# Patient Record
Sex: FEMALE | Race: OTHER | HISPANIC OR LATINO
[De-identification: names, ages, dates, MRNs, and addresses within clinical notes are randomized per-mention and may not be internally consistent; named-entity substitution may affect disease eponyms.]

---

## 2017-06-28 PROBLEM — Z00.00 ENCOUNTER FOR PREVENTIVE HEALTH EXAMINATION: Status: ACTIVE | Noted: 2017-06-28

## 2017-07-20 ENCOUNTER — APPOINTMENT (OUTPATIENT)
Dept: GASTROENTEROLOGY | Facility: CLINIC | Age: 32
End: 2017-07-20

## 2018-01-11 ENCOUNTER — APPOINTMENT (OUTPATIENT)
Dept: GASTROENTEROLOGY | Facility: CLINIC | Age: 33
End: 2018-01-11

## 2018-02-15 ENCOUNTER — TRANSCRIPTION ENCOUNTER (OUTPATIENT)
Age: 33
End: 2018-02-15

## 2018-02-15 ENCOUNTER — APPOINTMENT (OUTPATIENT)
Dept: GASTROENTEROLOGY | Facility: CLINIC | Age: 33
End: 2018-02-15
Payer: MEDICAID

## 2018-02-15 VITALS
SYSTOLIC BLOOD PRESSURE: 125 MMHG | DIASTOLIC BLOOD PRESSURE: 85 MMHG | OXYGEN SATURATION: 98 % | HEART RATE: 91 BPM | TEMPERATURE: 98.3 F | RESPIRATION RATE: 16 BRPM

## 2018-02-15 PROCEDURE — 99205 OFFICE O/P NEW HI 60 MIN: CPT

## 2018-03-16 ENCOUNTER — APPOINTMENT (OUTPATIENT)
Dept: GASTROENTEROLOGY | Facility: CLINIC | Age: 33
End: 2018-03-16
Payer: MEDICAID

## 2018-03-16 PROCEDURE — 91112 GI WIRELESS CAPSULE MEASURE: CPT

## 2018-03-23 ENCOUNTER — LABORATORY RESULT (OUTPATIENT)
Age: 33
End: 2018-03-23

## 2018-03-25 LAB
ALBUMIN SERPL ELPH-MCNC: 4.5 G/DL
ALP BLD-CCNC: 52 U/L
ALT SERPL-CCNC: 11 U/L
ANION GAP SERPL CALC-SCNC: 14 MMOL/L
AST SERPL-CCNC: 21 U/L
BILIRUB SERPL-MCNC: <0.2 MG/DL
BUN SERPL-MCNC: 10 MG/DL
CALCIUM SERPL-MCNC: 9.6 MG/DL
CHLORIDE SERPL-SCNC: 102 MMOL/L
CO2 SERPL-SCNC: 25 MMOL/L
CREAT SERPL-MCNC: 0.59 MG/DL
GLUCOSE SERPL-MCNC: 95 MG/DL
POTASSIUM SERPL-SCNC: 4.6 MMOL/L
PROT SERPL-MCNC: 7.8 G/DL
SODIUM SERPL-SCNC: 141 MMOL/L

## 2018-03-27 ENCOUNTER — RESULT REVIEW (OUTPATIENT)
Age: 33
End: 2018-03-27

## 2018-03-27 ENCOUNTER — OUTPATIENT (OUTPATIENT)
Dept: OUTPATIENT SERVICES | Facility: HOSPITAL | Age: 33
LOS: 1 days | Discharge: HOME | End: 2018-03-27
Payer: COMMERCIAL

## 2018-03-27 ENCOUNTER — APPOINTMENT (OUTPATIENT)
Dept: GASTROENTEROLOGY | Facility: HOSPITAL | Age: 33
End: 2018-03-27
Payer: MEDICAID

## 2018-03-27 PROCEDURE — 43235 EGD DIAGNOSTIC BRUSH WASH: CPT

## 2018-03-27 PROCEDURE — 88305 TISSUE EXAM BY PATHOLOGIST: CPT

## 2018-03-27 PROCEDURE — 43239 EGD BIOPSY SINGLE/MULTIPLE: CPT | Mod: 26

## 2018-03-27 PROCEDURE — 43235 EGD DIAGNOSTIC BRUSH WASH: CPT | Mod: 59

## 2018-03-27 PROCEDURE — C1889: CPT

## 2018-03-28 LAB — SURGICAL PATHOLOGY STUDY: SIGNIFICANT CHANGE UP

## 2018-03-29 ENCOUNTER — OUTPATIENT (OUTPATIENT)
Dept: OUTPATIENT SERVICES | Facility: HOSPITAL | Age: 33
LOS: 1 days | Discharge: HOME | End: 2018-03-29
Payer: COMMERCIAL

## 2018-03-29 ENCOUNTER — APPOINTMENT (OUTPATIENT)
Dept: GASTROENTEROLOGY | Facility: HOSPITAL | Age: 33
End: 2018-03-29
Payer: MEDICAID

## 2018-03-29 PROCEDURE — 91037 ESOPH IMPED FUNCTION TEST: CPT

## 2018-03-29 PROCEDURE — 91035 G-ESOPH REFLX TST W/ELECTROD: CPT | Mod: 26

## 2018-03-29 PROCEDURE — 91010 ESOPHAGUS MOTILITY STUDY: CPT

## 2018-05-03 ENCOUNTER — APPOINTMENT (OUTPATIENT)
Dept: GASTROENTEROLOGY | Facility: CLINIC | Age: 33
End: 2018-05-03
Payer: MEDICAID

## 2018-05-03 VITALS
RESPIRATION RATE: 14 BRPM | HEIGHT: 62 IN | OXYGEN SATURATION: 98 % | BODY MASS INDEX: 33.31 KG/M2 | HEART RATE: 77 BPM | WEIGHT: 181 LBS | TEMPERATURE: 98.1 F | SYSTOLIC BLOOD PRESSURE: 110 MMHG | DIASTOLIC BLOOD PRESSURE: 73 MMHG

## 2018-05-03 PROCEDURE — 99205 OFFICE O/P NEW HI 60 MIN: CPT

## 2018-05-18 ENCOUNTER — OTHER (OUTPATIENT)
Age: 33
End: 2018-05-18

## 2018-10-19 ENCOUNTER — OTHER (OUTPATIENT)
Age: 33
End: 2018-10-19

## 2018-10-26 ENCOUNTER — APPOINTMENT (OUTPATIENT)
Dept: UROLOGY | Facility: CLINIC | Age: 33
End: 2018-10-26

## 2018-11-08 ENCOUNTER — APPOINTMENT (OUTPATIENT)
Dept: GASTROENTEROLOGY | Facility: CLINIC | Age: 33
End: 2018-11-08
Payer: MEDICAID

## 2018-11-08 VITALS
SYSTOLIC BLOOD PRESSURE: 110 MMHG | RESPIRATION RATE: 14 BRPM | BODY MASS INDEX: 34.57 KG/M2 | OXYGEN SATURATION: 98 % | DIASTOLIC BLOOD PRESSURE: 89 MMHG | HEART RATE: 87 BPM | WEIGHT: 189 LBS

## 2018-11-08 DIAGNOSIS — Z86.39 PERSONAL HISTORY OF OTHER ENDOCRINE, NUTRITIONAL AND METABOLIC DISEASE: ICD-10-CM

## 2018-11-08 DIAGNOSIS — Z87.19 PERSONAL HISTORY OF OTHER DISEASES OF THE DIGESTIVE SYSTEM: ICD-10-CM

## 2018-11-08 DIAGNOSIS — Z87.09 PERSONAL HISTORY OF OTHER DISEASES OF THE RESPIRATORY SYSTEM: ICD-10-CM

## 2018-11-08 DIAGNOSIS — Z83.3 FAMILY HISTORY OF DIABETES MELLITUS: ICD-10-CM

## 2018-11-08 PROCEDURE — 99214 OFFICE O/P EST MOD 30 MIN: CPT

## 2018-11-08 RX ORDER — BACILLUS COAGULANS/INULIN 1B-250 MG
CAPSULE ORAL
Refills: 0 | Status: ACTIVE | COMMUNITY

## 2019-07-10 ENCOUNTER — APPOINTMENT (OUTPATIENT)
Dept: ORTHOPEDIC SURGERY | Facility: CLINIC | Age: 34
End: 2019-07-10

## 2019-07-25 ENCOUNTER — APPOINTMENT (OUTPATIENT)
Dept: OBGYN | Facility: CLINIC | Age: 34
End: 2019-07-25
Payer: MEDICAID

## 2019-07-25 DIAGNOSIS — Z87.42 PERSONAL HISTORY OF OTHER DISEASES OF THE FEMALE GENITAL TRACT: ICD-10-CM

## 2019-07-25 DIAGNOSIS — Z01.419 ENCOUNTER FOR GYNECOLOGICAL EXAMINATION (GENERAL) (ROUTINE) W/OUT ABNORMAL FINDINGS: ICD-10-CM

## 2019-07-25 DIAGNOSIS — N92.6 IRREGULAR MENSTRUATION, UNSPECIFIED: ICD-10-CM

## 2019-07-25 PROCEDURE — 36415 COLL VENOUS BLD VENIPUNCTURE: CPT

## 2019-07-25 PROCEDURE — 99385 PREV VISIT NEW AGE 18-39: CPT

## 2019-07-25 NOTE — PHYSICAL EXAM
[Awake] : awake [Alert] : alert [Soft] : soft [Oriented x3] : oriented to person, place, and time [Normal] : uterus [No Bleeding] : there was no active vaginal bleeding [Pap Obtained] : a Pap smear was performed [Uterine Adnexae] : were not tender and not enlarged [Acute Distress] : no acute distress [Mass] : no breast mass [Nipple Discharge] : no nipple discharge [Axillary LAD] : no axillary lymphadenopathy [Tender] : non tender [FreeTextEntry7] : unable to detainee due to adipose thickness of abdominal wall.

## 2019-07-25 NOTE — HISTORY OF PRESENT ILLNESS
[1 Year Ago] : 1 year ago [Last Colonoscopy ___] : Last colonoscopy [unfilled] [Last Pap ___] : Last cervical pap smear was [unfilled] [Reproductive Age] : is of reproductive age [Definite:  ___ (Date)] : the last menstrual period was [unfilled] [Pregnancy History] : denies prior pregnancies [Regular Cycle Intervals] : periods have been irregular

## 2019-07-29 ENCOUNTER — TRANSCRIPTION ENCOUNTER (OUTPATIENT)
Age: 34
End: 2019-07-29

## 2019-08-04 ENCOUNTER — FORM ENCOUNTER (OUTPATIENT)
Age: 34
End: 2019-08-04

## 2019-08-05 ENCOUNTER — APPOINTMENT (OUTPATIENT)
Dept: ORTHOPEDIC SURGERY | Facility: CLINIC | Age: 34
End: 2019-08-05
Payer: MEDICAID

## 2019-08-05 ENCOUNTER — OUTPATIENT (OUTPATIENT)
Dept: OUTPATIENT SERVICES | Facility: HOSPITAL | Age: 34
LOS: 1 days | End: 2019-08-05
Payer: MEDICAID

## 2019-08-05 DIAGNOSIS — M25.562 PAIN IN LEFT KNEE: ICD-10-CM

## 2019-08-05 DIAGNOSIS — M16.10 UNILATERAL PRIMARY OSTEOARTHRITIS, UNSPECIFIED HIP: ICD-10-CM

## 2019-08-05 DIAGNOSIS — M25.561 PAIN IN RIGHT KNEE: ICD-10-CM

## 2019-08-05 PROCEDURE — 99203 OFFICE O/P NEW LOW 30 MIN: CPT

## 2019-08-05 PROCEDURE — 73562 X-RAY EXAM OF KNEE 3: CPT | Mod: 26,LT,RT

## 2019-08-05 PROCEDURE — 73562 X-RAY EXAM OF KNEE 3: CPT

## 2019-08-07 LAB
DHEA-S SERPL-MCNC: 217 UG/DL
ESTRADIOL SERPL-MCNC: 486 PG/ML
FSH SERPL-MCNC: 3 IU/L
LH SERPL-ACNC: 11.2 IU/L
PROLACTIN SERPL-MCNC: 13.5 NG/ML
T3 SERPL-MCNC: 122 NG/DL
T4 SERPL-MCNC: 6.2 UG/DL
TESTOST BND SERPL-MCNC: 3.2 PG/ML
TESTOST SERPL-MCNC: 39.5 NG/DL
TSH SERPL-ACNC: 2.12 UIU/ML

## 2019-08-09 VITALS
DIASTOLIC BLOOD PRESSURE: 80 MMHG | BODY MASS INDEX: 34.78 KG/M2 | WEIGHT: 189 LBS | HEIGHT: 62 IN | HEART RATE: 86 BPM | SYSTOLIC BLOOD PRESSURE: 110 MMHG | OXYGEN SATURATION: 99 %

## 2019-08-09 PROBLEM — M25.561 KNEE PAIN, RIGHT: Status: ACTIVE | Noted: 2019-08-05

## 2019-08-09 PROBLEM — M25.562 KNEE PAIN, LEFT: Status: ACTIVE | Noted: 2019-08-05

## 2019-08-09 NOTE — ASSESSMENT
[FreeTextEntry1] : 34yo female with mild OA of bilateral knees\par weight loss\par home knee strengthening exercises \par she will be enrolled in the knee brace study if approved\par continue NSAIDs PRN\par follow up 4-6 weeks\par \par \par All medical record entries made by the PA/Scribe/Fellow are at my, Dr. Tolu Judge's direction and personally dictated by me on 08/05/2019]. I have reviewed the chart and agree that the record accurately reflects my personal performance of the history, physical exam, assessment, and plan. I have also personally directed reviewed, and agreed with the chart.\par

## 2019-08-09 NOTE — HISTORY OF PRESENT ILLNESS
[Stable] : stable [2] : a current pain level of 2/10 [5] : an average pain level of 5/10 [Intermit.] : ~He/She~ states the symptoms seem to be intermittent [Walking] : worsened by walking [Bending] : worsened by bending [Physical Therapy] : relieved by physical therapy [de-identified] : 32yo female with bilateral knee pain, left worse than right. she states that this has been going on for 3 years. she has not had any previous treatment besides PT. her last session of PT was in July and it did help her. she enjoys walking and states that sometimes her knees hurt during walks. she is not physically active and is overweight.  [Ataxia] : no ataxia [Incontinence] : no incontinence

## 2019-08-09 NOTE — PHYSICAL EXAM
[] : Motor: [NL] : normal and symmetric bilaterally [LE] : Sensory: Intact in bilateral lower extremities [2+] : right 2+ [Normal] : Oriented to person, place, and time, insight and judgement were intact and the affect was normal [de-identified] : B/L Knee: skin intact. no effusions. ROM 0-130 with no pain. negative lachman, kavin's, posterior drawer. collateral ligaments stable. negative apley compression.  [de-identified] : xrays bilateral knees: no fractures or dislocations. joint space is narrowed, mild valgus alignment. impression mild OA bilateral knees.

## 2020-01-06 ENCOUNTER — APPOINTMENT (OUTPATIENT)
Dept: GASTROENTEROLOGY | Facility: CLINIC | Age: 35
End: 2020-01-06
Payer: MEDICAID

## 2020-01-06 VITALS
RESPIRATION RATE: 15 BRPM | SYSTOLIC BLOOD PRESSURE: 115 MMHG | OXYGEN SATURATION: 98 % | HEIGHT: 62 IN | HEART RATE: 90 BPM | BODY MASS INDEX: 32.57 KG/M2 | DIASTOLIC BLOOD PRESSURE: 83 MMHG | TEMPERATURE: 98.2 F | WEIGHT: 177 LBS

## 2020-01-06 DIAGNOSIS — K59.04 CHRONIC IDIOPATHIC CONSTIPATION: ICD-10-CM

## 2020-01-06 PROCEDURE — 99204 OFFICE O/P NEW MOD 45 MIN: CPT

## 2020-01-08 NOTE — PHYSICAL EXAM
[General Appearance - Alert] : alert [General Appearance - In No Acute Distress] : in no acute distress [PERRL With Normal Accommodation] : pupils were equal in size, round, and reactive to light [Sclera] : the sclera and conjunctiva were normal [Extraocular Movements] : extraocular movements were intact [Neck Appearance] : the appearance of the neck was normal [Respiration, Rhythm And Depth] : normal respiratory rhythm and effort [Exaggerated Use Of Accessory Muscles For Inspiration] : no accessory muscle use [Heart Rate And Rhythm] : heart rate was normal and rhythm regular [Heart Sounds] : normal S1 and S2 [Bowel Sounds] : normal bowel sounds [Abdomen Soft] : soft [Abdomen Tenderness] : non-tender [Abdomen Mass (___ Cm)] : no abdominal mass palpated [Abdomen Hernia] : no hernia was discovered [Normal Sphincter Tone] : normal sphincter tone [No Rectal Mass] : no rectal mass [Abnormal Walk] : normal gait [Musculoskeletal - Swelling] : no joint swelling seen [Skin Turgor] : normal skin turgor [] : no rash [Cranial Nerves] : cranial nerves 2-12 were intact [No Focal Deficits] : no focal deficits [Oriented To Time, Place, And Person] : oriented to person, place, and time [Impaired Insight] : insight and judgment were intact [External Hemorrhoid] : no external hemorrhoids [FreeTextEntry1] : Chaperone by Sherlyn

## 2020-01-08 NOTE — HISTORY OF PRESENT ILLNESS
[de-identified] : 34F w/ hx of asthma, anxiety/depression, IBS-C p/f follow up for abdominal pain, bloating, and constipation.  Patient reports ongoing symptoms since prior visit in 2018 with Dr. Bangura but was lost to followup after she was no longer available.  Since then, patient has discontinued linzess due to poor efficacy and noted worsening abd pain, nausea, bloating, globus sensation, constipation, and blood on toilet paper.  She was evaluated by an ENT last summer and was told she has acid before starting prilosec 40 mg once a day. She reports following a psychiatrist but not taking any antidepressant due to not being offered her choice.  She also reports use of naproxen for her joint pain.  \par \par Other than the above symptoms, the patient offers no additional symptoms at today's visit. She specifically denies any odynophagia, hoarseness, changes in voice, vomiting, localized/focal unintentional weight loss, fevers, chills, skin rash, loose stool, melena.  She does report some blood on toilet paper with wiping, but not in stool.  \par \par She has no known personal or family history of any gastrointestinal disease. She also has no reported history of any acute or chronic viral illnesses in the past and does not have any recent travel history outside of the [place]. She does not use narcotics, TCAs, prokinetics or any other medications with known effects on gastrointestinal motility. She has no prior history or clinical symptoms suggestive of underlying autoimmune or connective tissue disease, and reports no prior gastric or thoracic surgeries.\par \par Prior evaluation\par EGD 2018: Mild duodenitis, otherwise normal study. Biopsies negative for H. pylori\par [employer] pH capsule testing: normal study\par SmartPill/WMC testing: Normal gastric and small bowel transit time, prolonged colonic transit time\par ARM: Weak augmentation of sphincter tone, weak generation of IR pressures with bearing down, mild visceral hypersensitivity\par

## 2020-01-08 NOTE — ASSESSMENT
[FreeTextEntry1] : 34F w/ hx of asthma, anxiety/depression, IBS-C p/f follow up for abdominal pain, bloating, and constipation.\par \par Abdominal pain: Likely from bloating and gas distension in the setting of IBS-C.  No longer on Linzess and reports inadherence to miralax.  Discussed at length of low FODMAP diet and initiation of motegrity following discussion of R/B/A.  \par -Motegrity 2 mg once a day\par -Miralax twice a day prn\par -TCA if unable to obtain motegrity\par -Refer to biofeedback\par -low FODMAP diet\par \par Heartburn: \par Prior bravo with normal study but now with increase in naproxen use.  \par -Avoid NSAIDs, discussed tylenol 3g/d for pain control\par -Continue prilosec 40 mg qd\par \par Anxiety/Depression:\par Patient seeing a psychiatrist and reports compliance.  \par -Continue psych followup\par \par RTC in 4w to reassess symptom\par \par Tolu Liriano MD\par PGY4, Gastroenterology\par

## 2020-01-15 ENCOUNTER — MOBILE ON CALL (OUTPATIENT)
Age: 35
End: 2020-01-15

## 2020-02-04 ENCOUNTER — APPOINTMENT (OUTPATIENT)
Dept: GASTROENTEROLOGY | Facility: CLINIC | Age: 35
End: 2020-02-04

## 2020-03-09 ENCOUNTER — APPOINTMENT (OUTPATIENT)
Dept: GASTROENTEROLOGY | Facility: CLINIC | Age: 35
End: 2020-03-09
Payer: MEDICAID

## 2020-03-09 VITALS
OXYGEN SATURATION: 98 % | WEIGHT: 176 LBS | HEART RATE: 101 BPM | TEMPERATURE: 98.7 F | DIASTOLIC BLOOD PRESSURE: 80 MMHG | BODY MASS INDEX: 32.39 KG/M2 | HEIGHT: 62 IN | RESPIRATION RATE: 15 BRPM | SYSTOLIC BLOOD PRESSURE: 110 MMHG

## 2020-03-09 PROCEDURE — 99214 OFFICE O/P EST MOD 30 MIN: CPT

## 2020-03-09 RX ORDER — LINACLOTIDE 72 UG/1
72 CAPSULE, GELATIN COATED ORAL TWICE DAILY
Qty: 60 | Refills: 0 | Status: DISCONTINUED | COMMUNITY
Start: 2018-11-08 | End: 2020-03-09

## 2020-03-09 RX ORDER — CIMETIDINE 800 MG
TABLET ORAL
Refills: 0 | Status: DISCONTINUED | COMMUNITY
End: 2020-03-09

## 2020-03-09 RX ORDER — DOCUSATE SODIUM 100 MG/1
100 CAPSULE ORAL TWICE DAILY
Qty: 60 | Refills: 0 | Status: DISCONTINUED | COMMUNITY
Start: 2020-01-15 | End: 2020-03-09

## 2020-03-09 RX ORDER — LACTULOSE 10 G/15ML
SOLUTION ORAL
Refills: 0 | Status: DISCONTINUED | COMMUNITY
End: 2020-03-09

## 2020-03-09 RX ORDER — FLUOXETINE HYDROCHLORIDE 10 MG/1
10 CAPSULE ORAL
Refills: 0 | Status: DISCONTINUED | COMMUNITY
End: 2020-03-09

## 2020-03-09 RX ORDER — OMEPRAZOLE MAGNESIUM 20 MG/1
20 CAPSULE, DELAYED RELEASE ORAL
Refills: 0 | Status: DISCONTINUED | COMMUNITY
End: 2020-03-09

## 2020-03-09 RX ORDER — PSYLLIUM SEED
50 POWDER (GRAM) ORAL
Refills: 0 | Status: DISCONTINUED | COMMUNITY
End: 2020-03-09

## 2020-03-09 RX ORDER — NAPROXEN 500 MG/1
500 TABLET ORAL
Refills: 0 | Status: DISCONTINUED | COMMUNITY
End: 2020-03-09

## 2020-03-09 NOTE — ASSESSMENT
[FreeTextEntry1] : 34F w/ hx of asthma, anxiety/depression, IBS-C presents for post-ED visit follow-up with c/o abdominal pain, nausea, and heartburn\par \par # IBS-C\par - Pt states that she previously had improvement on Linzess 72 mcg x 2 days, however, discontinued as it no longer had efficacy. At that time, she was not dose optimized, thus, recommend increasing to Linzess 290 mcg daily. Discussed potential side effects including diarrhea and appropriate dosing\par - If no improvement on Linzess, will consider Motegrity\par - C/w Miralax PRN\par - Low FODMAP diet\par - Referral to biofeedback\par \par Heartburn: \par Prior bravo with normal study but now with increase in naproxen use.  \par -Avoid NSAIDs, discussed tylenol 3g/d for pain control\par -Continue protonix x 8 weeks, re-evaluate and consider tapering at that time pending symptomatic improvement\par \par Anxiety/Depression:\par Patient seeing a psychiatrist and reports compliance.  \par -Continue psych followup\par -If consideration of neuromodulators in the future, would discuss with patients psychiatrist prior\par \par F/u via phone or patient portal in 2 weeks to assess for symptom improvement\par

## 2020-03-09 NOTE — HISTORY OF PRESENT ILLNESS
[de-identified] : 34F w/ hx of asthma, anxiety/depression, IBS-C presents for post-ED visit follow-up. \par \par Pt states that 2 weeks ago she presented to [facility] ED for worsening abdominal pain, nausea, gagging, and acid reflux. She had blood work, a CT A/P, and urine studies which were all normal. She was diagnosed with gastritis and started on Protonix with resolution of her abdominal pain, however, continues to have regurgitation and nausea. \par \par Since her last visit she tried Colace without improvement. Currently on Senna and Miralax with mild improvement. Has not tried Motegrity yet as it required insurance auth. Has a BM daily with incomplete evacuation and straining, last BM yesterday AM.  Also reports bloating, but denies melena, hematochezia, odynophagia, dysphagia. \par \par PRIOR HX:\par Patient reports ongoing symptoms since prior visit in 2018 with Dr. Bangura but was lost to followup after she was no longer available.  Since then, patient has discontinued linzess due to poor efficacy and noted worsening abd pain, nausea, bloating, globus sensation, constipation, and blood on toilet paper.  She was evaluated by an ENT last summer and was told she has acid before starting prilosec 40 mg once a day. She reports following a psychiatrist but not taking any antidepressant due to not being offered her choice.  She also reports use of naproxen for her joint pain.  \par \par Other than the above symptoms, the patient offers no additional symptoms at today's visit. She specifically denies any odynophagia, hoarseness, changes in voice, vomiting, localized/focal unintentional weight loss, fevers, chills, skin rash, loose stool, melena.  She does report some blood on toilet paper with wiping, but not in stool.  \par \par She has no known personal or family history of any gastrointestinal disease. She also has no reported history of any acute or chronic viral illnesses in the past and does not have any recent travel history outside of the [place]. She does not use narcotics, TCAs, prokinetics or any other medications with known effects on gastrointestinal motility. She has no prior history or clinical symptoms suggestive of underlying autoimmune or connective tissue disease, and reports no prior gastric or thoracic surgeries.\par \par Prior evaluation\par EGD 2018: Mild duodenitis, otherwise normal study. Biopsies negative for H. pylori\par [employer] pH capsule testing: normal study\par SmartPill/WMC testing: Normal gastric and small bowel transit time, prolonged colonic transit time\par ARM: Weak augmentation of sphincter tone, weak generation of IR pressures with bearing down, mild visceral hypersensitivity\par

## 2020-03-09 NOTE — PHYSICAL EXAM
[General Appearance - Alert] : alert [General Appearance - In No Acute Distress] : in no acute distress [Sclera] : the sclera and conjunctiva were normal [PERRL With Normal Accommodation] : pupils were equal in size, round, and reactive to light [Extraocular Movements] : extraocular movements were intact [Neck Appearance] : the appearance of the neck was normal [Exaggerated Use Of Accessory Muscles For Inspiration] : no accessory muscle use [Bowel Sounds] : normal bowel sounds [Abdomen Soft] : soft [Abdomen Tenderness] : non-tender [Abdomen Mass (___ Cm)] : no abdominal mass palpated [Abdomen Hernia] : no hernia was discovered [Abnormal Walk] : normal gait [Skin Turgor] : normal skin turgor [] : no rash [No Focal Deficits] : no focal deficits [Oriented To Time, Place, And Person] : oriented to person, place, and time [General Appearance - Well Nourished] : well nourished [General Appearance - Well Developed] : well developed [Outer Ear] : the ears and nose were normal in appearance [Hearing Threshold Finger Rub Not [place]] : hearing was normal [Examination Of The Oral Cavity] : the lips and gums were normal [Oropharynx] : the oropharynx was normal [Edema] : there was no peripheral edema [Cervical Lymph Nodes Enlarged Posterior Bilaterally] : posterior cervical [Cervical Lymph Nodes Enlarged Anterior Bilaterally] : anterior cervical [Supraclavicular Lymph Nodes Enlarged Bilaterally] : supraclavicular [No CVA Tenderness] : no ~M costovertebral angle tenderness [No Spinal Tenderness] : no spinal tenderness [Affect] : the affect was normal [Mood] : the mood was normal [External Hemorrhoid] : no external hemorrhoids

## 2020-04-08 RX ORDER — PRUCALOPRIDE 2 MG/1
2 TABLET, FILM COATED ORAL
Qty: 30 | Refills: 2 | Status: DISCONTINUED | COMMUNITY
Start: 2020-01-06 | End: 2020-04-08

## 2020-04-13 ENCOUNTER — FORM ENCOUNTER (OUTPATIENT)
Age: 35
End: 2020-04-13

## 2020-05-05 ENCOUNTER — APPOINTMENT (OUTPATIENT)
Dept: GASTROENTEROLOGY | Facility: CLINIC | Age: 35
End: 2020-05-05

## 2020-07-22 ENCOUNTER — LABORATORY RESULT (OUTPATIENT)
Age: 35
End: 2020-07-22

## 2020-07-22 ENCOUNTER — APPOINTMENT (OUTPATIENT)
Age: 35
End: 2020-07-22
Payer: MEDICAID

## 2020-07-22 DIAGNOSIS — R11.2 NAUSEA WITH VOMITING, UNSPECIFIED: ICD-10-CM

## 2020-07-22 PROCEDURE — 36415 COLL VENOUS BLD VENIPUNCTURE: CPT

## 2020-07-22 PROCEDURE — 99214 OFFICE O/P EST MOD 30 MIN: CPT | Mod: 25

## 2020-07-22 RX ORDER — MELATONIN 3 MG
25 MCG TABLET ORAL
Refills: 0 | Status: DISCONTINUED | COMMUNITY
End: 2020-07-22

## 2020-07-22 RX ORDER — MIRTAZAPINE 15 MG/1
15 TABLET, FILM COATED ORAL
Refills: 0 | Status: DISCONTINUED | COMMUNITY
Start: 2020-03-09 | End: 2020-07-22

## 2020-07-22 RX ORDER — ONDANSETRON 4 MG/1
4 TABLET, ORALLY DISINTEGRATING ORAL EVERY 8 HOURS
Qty: 30 | Refills: 0 | Status: ACTIVE | COMMUNITY
Start: 2020-07-22 | End: 1900-01-01

## 2020-07-22 RX ORDER — SENNOSIDES 8.6 MG TABLETS 8.6 MG/1
8.6 TABLET ORAL DAILY
Qty: 120 | Refills: 0 | Status: DISCONTINUED | COMMUNITY
Start: 2020-01-15 | End: 2020-07-22

## 2020-07-23 LAB
25(OH)D3 SERPL-MCNC: 32.1 NG/ML
CRP SERPL-MCNC: 0.35 MG/DL
ENA SCL70 IGG SER IA-ACNC: <0.2 AL
FERRITIN SERPL-MCNC: 31 NG/ML
FOLATE SERPL-MCNC: 17.3 NG/ML
GLIADIN IGA SER QL: 7.9 UNITS
GLIADIN IGG SER QL: <5 UNITS
GLIADIN PEPTIDE IGA SER-ACNC: NEGATIVE
GLIADIN PEPTIDE IGG SER-ACNC: NEGATIVE
IGA SER QL IEP: 140 MG/DL
IRON SATN MFR SERPL: 19 %
IRON SERPL-MCNC: 66 UG/DL
TIBC SERPL-MCNC: 345 UG/DL
TTG IGA SER IA-ACNC: <1.2 U/ML
TTG IGA SER-ACNC: NEGATIVE
TTG IGG SER IA-ACNC: 1.6 U/ML
TTG IGG SER IA-ACNC: NEGATIVE
UIBC SERPL-MCNC: 279 UG/DL
VIT B12 SERPL-MCNC: 332 PG/ML

## 2020-07-23 NOTE — PHYSICAL EXAM
[General Appearance - Well Nourished] : well nourished [General Appearance - In No Acute Distress] : in no acute distress [General Appearance - Alert] : alert [Sclera] : the sclera and conjunctiva were normal [General Appearance - Well Developed] : well developed [PERRL With Normal Accommodation] : pupils were equal in size, round, and reactive to light [Extraocular Movements] : extraocular movements were intact [Outer Ear] : the ears and nose were normal in appearance [Hearing Threshold Finger Rub Not [place]] : hearing was normal [Oropharynx] : the oropharynx was normal [Examination Of The Oral Cavity] : the lips and gums were normal [Neck Appearance] : the appearance of the neck was normal [Exaggerated Use Of Accessory Muscles For Inspiration] : no accessory muscle use [Edema] : there was no peripheral edema [Bowel Sounds] : normal bowel sounds [Abdomen Soft] : soft [Abdomen Tenderness] : non-tender [Abdomen Hernia] : no hernia was discovered [Abdomen Mass (___ Cm)] : no abdominal mass palpated [Abnormal Walk] : normal gait [No Spinal Tenderness] : no spinal tenderness [] : no rash [No Focal Deficits] : no focal deficits [Skin Turgor] : normal skin turgor [Affect] : the affect was normal [Mood] : the mood was normal [Oriented To Time, Place, And Person] : oriented to person, place, and time [FreeTextEntry1] : slight tenderness felt throughout

## 2020-07-23 NOTE — HISTORY OF PRESENT ILLNESS
[de-identified] : 34F w/ hx of asthma, anxiety/depression, and constipation presents for follow up for continued symptoms along with new symptom of nausea. \par \par Patient reports that since last visit with Dr. Rosado (3/2020), she has began to have nausea with gagging and will vomit some clear liquids. Nausea is happening every day. She has vomited actual food one time but mostly is just liquids. She is feeling worse than last visit.  \par She has been trying to drink a lot of water be more active. Can not pinpoint food trigger. No blood in vomit.\par \par Taking Linzess 290 mcg - depending on day, anywhere from 1-3 bowel movements (sometimes diarrhea). Eating oatmeal or brand flakes. Eating 1 meal per day and not hungry to eat much more than that. She notices her anxiety and stress has gotten worse. She may have lost a couple of pounds. Sometimes blood when wipes with straining. Has a history of internal hemorrhoids found on colonoscopy in 2016. \par \par Previous history:\par Pt states that 2 weeks ago she presented to [facility] ED for worsening abdominal pain, nausea, gagging, and acid reflux. She had blood work, a CT A/P, and urine studies which were all normal. She was diagnosed with gastritis and started on Protonix with resolution of her abdominal pain, however, continues to have regurgitation and nausea. \par \par Since her last visit she tried Colace without improvement. Currently on Senna and Miralax with mild improvement. Has not tried Motegrity yet as it required insurance auth. Has a BM daily with incomplete evacuation and straining, last BM yesterday AM.  Also reports bloating, but denies melena, hematochezia, odynophagia, dysphagia. \par \par PRIOR HX:\par Patient reports ongoing symptoms since prior visit in 2018 with Dr. Bangura but was lost to followup after she was no longer available.  Since then, patient has discontinued linzess due to poor efficacy and noted worsening abd pain, nausea, bloating, globus sensation, constipation, and blood on toilet paper.  She was evaluated by an ENT last summer and was told she has acid before starting prilosec 40 mg once a day. She reports following a psychiatrist but not taking any antidepressant due to not being offered her choice.  She also reports use of naproxen for her joint pain.  \par \par Other than the above symptoms, the patient offers no additional symptoms at today's visit. She specifically denies any odynophagia, hoarseness, changes in voice, vomiting, localized/focal unintentional weight loss, fevers, chills, skin rash, loose stool, melena.  She does report some blood on toilet paper with wiping, but not in stool.  \par \par She has no known personal or family history of any gastrointestinal disease. She also has no reported history of any acute or chronic viral illnesses in the past and does not have any recent travel history outside of the [place]. She does not use narcotics, TCAs, prokinetics or any other medications with known effects on gastrointestinal motility. She has no prior history or clinical symptoms suggestive of underlying autoimmune or connective tissue disease, and reports no prior gastric or thoracic surgeries.\par \par Prior evaluation\par EGD 2018: Mild duodenitis, otherwise normal study. Biopsies negative for H. pylori\par [employer] pH capsule testing: normal study\par SmartPill/WMC testing: Normal gastric and small bowel transit time, prolonged colonic transit time\par ARM: Weak augmentation of sphincter tone, weak generation of IR pressures with bearing down, mild visceral hypersensitivity\par

## 2020-07-23 NOTE — ASSESSMENT
[FreeTextEntry1] : 34F w/ hx of asthma, anxiety/depression, and constipation presents for follow-up with c/o nausea, early satiety and intermittent diarrhea/constipation with straining  \par \par Nausea \par - unclear etiology- could be but not limited to underlying autoimmune disease, food allergies, gallbladder disease \par - collect food allergy panel, ASIA and celiac disease markers, scleroderma antibodies \par - may need imaging whether x ray or repeat CT scan, encouraged patient to send hard copy of previous CT scan in march to review\par - retrieve all recent labs including CBC/CMP\par - prescribed Zofran for symptomatic relief \par \par Constipation \par - currently taking Linzess 290mcg and having 3 BMs which sometimes consist of diarrhea therefore recommend adding fiber in the morning (recommended either Citrucel, psyllium husk or Benefiber, 1 tablespoon daily) \par - may need to decrease dose to 145mcg if fiber does not help bulk stool or consider Motegrity with finding of delayed colonic transit time on SmartPill \par - will discuss biofeedback therapy at next visit due to ARM results \par - may consider stool samples in diarrhea persists but likely due to high dose Linzess\par \par Anxiety/Depression:\par Patient seeing a psychiatrist and reports compliance.  \par -Continue psych followup\par -If consideration of neuromodulators in the future, would discuss with patients psychiatrist prior\par \par f/u with results and next steps

## 2020-07-24 ENCOUNTER — TRANSCRIPTION ENCOUNTER (OUTPATIENT)
Age: 35
End: 2020-07-24

## 2020-07-24 LAB — ANA SER IF-ACNC: NEGATIVE

## 2020-07-27 ENCOUNTER — TRANSCRIPTION ENCOUNTER (OUTPATIENT)
Age: 35
End: 2020-07-27

## 2020-07-27 LAB
BARLEY IGE QN: <0.1 KUA/L
CHERRY IGE QN: <0.1 KUA/L
COW MILK IGE QN: <0.1 KUA/L
CRAB IGE QN: <0.1 KUA/L
DEPRECATED BARLEY IGE RAST QL: 0
DEPRECATED CHERRY IGE RAST QL: 0
DEPRECATED COW MILK IGE RAST QL: 0
DEPRECATED CRAB IGE RAST QL: 0
DEPRECATED EGG WHITE IGE RAST QL: 0
DEPRECATED OAT IGE RAST QL: 0
DEPRECATED PEANUT IGE RAST QL: 0
DEPRECATED RYE IGE RAST QL: 0
DEPRECATED SOYBEAN IGE RAST QL: 0
DEPRECATED WHEAT IGE RAST QL: 0
EGG WHITE IGE QN: <0.1 KUA/L
OAT IGE QN: <0.1 KUA/L
PEANUT IGE QN: <0.1 KUA/L
RYE IGE QN: <0.1 KUA/L
SOYBEAN IGE QN: <0.1 KUA/L
TOTAL IGE SMQN RAST: 50 KU/L
WHEAT IGE QN: <0.1 KUA/L

## 2020-07-28 ENCOUNTER — TRANSCRIPTION ENCOUNTER (OUTPATIENT)
Age: 35
End: 2020-07-28

## 2020-07-29 ENCOUNTER — TRANSCRIPTION ENCOUNTER (OUTPATIENT)
Age: 35
End: 2020-07-29

## 2020-07-30 ENCOUNTER — TRANSCRIPTION ENCOUNTER (OUTPATIENT)
Age: 35
End: 2020-07-30

## 2020-07-31 ENCOUNTER — TRANSCRIPTION ENCOUNTER (OUTPATIENT)
Age: 35
End: 2020-07-31

## 2020-08-03 ENCOUNTER — TRANSCRIPTION ENCOUNTER (OUTPATIENT)
Age: 35
End: 2020-08-03

## 2020-08-13 ENCOUNTER — TRANSCRIPTION ENCOUNTER (OUTPATIENT)
Age: 35
End: 2020-08-13

## 2020-08-17 LAB
C DIFF TOX GENS STL QL NAA+PROBE: NORMAL
CDIFF BY PCR: NOT DETECTED
DEPRECATED O AND P PREP STL: NORMAL
GI PCR PANEL, STOOL: ABNORMAL

## 2020-08-20 ENCOUNTER — TRANSCRIPTION ENCOUNTER (OUTPATIENT)
Age: 35
End: 2020-08-20

## 2020-08-20 LAB — PANCREATIC ELASTASE, FECAL: >500

## 2020-08-26 ENCOUNTER — TRANSCRIPTION ENCOUNTER (OUTPATIENT)
Age: 35
End: 2020-08-26

## 2020-08-27 ENCOUNTER — TRANSCRIPTION ENCOUNTER (OUTPATIENT)
Age: 35
End: 2020-08-27

## 2020-08-27 LAB — CALPROTECTIN FECAL: 86 UG/G

## 2020-08-27 RX ORDER — LINACLOTIDE 145 UG/1
145 CAPSULE, GELATIN COATED ORAL
Qty: 30 | Refills: 2 | Status: ACTIVE | COMMUNITY
Start: 2020-08-27 | End: 1900-01-01

## 2020-08-28 ENCOUNTER — TRANSCRIPTION ENCOUNTER (OUTPATIENT)
Age: 35
End: 2020-08-28

## 2020-09-22 ENCOUNTER — APPOINTMENT (OUTPATIENT)
Age: 35
End: 2020-09-22
Payer: MEDICAID

## 2020-09-22 VITALS
HEART RATE: 115 BPM | WEIGHT: 178.38 LBS | TEMPERATURE: 98.1 F | DIASTOLIC BLOOD PRESSURE: 82 MMHG | OXYGEN SATURATION: 98 % | BODY MASS INDEX: 32.82 KG/M2 | HEIGHT: 62 IN | SYSTOLIC BLOOD PRESSURE: 118 MMHG

## 2020-09-22 DIAGNOSIS — K59.09 OTHER CONSTIPATION: ICD-10-CM

## 2020-09-22 DIAGNOSIS — R09.89 OTHER SPECIFIED SYMPTOMS AND SIGNS INVOLVING THE CIRCULATORY AND RESPIRATORY SYSTEMS: ICD-10-CM

## 2020-09-22 DIAGNOSIS — K21.9 GASTRO-ESOPHAGEAL REFLUX DISEASE W/OUT ESOPHAGITIS: ICD-10-CM

## 2020-09-22 DIAGNOSIS — K58.1 IRRITABLE BOWEL SYNDROME WITH CONSTIPATION: ICD-10-CM

## 2020-09-22 DIAGNOSIS — K52.9 NONINFECTIVE GASTROENTERITIS AND COLITIS, UNSPECIFIED: ICD-10-CM

## 2020-09-22 PROCEDURE — 99214 OFFICE O/P EST MOD 30 MIN: CPT

## 2020-09-22 RX ORDER — FAMOTIDINE 40 MG/1
40 TABLET, FILM COATED ORAL
Qty: 30 | Refills: 2 | Status: ACTIVE | COMMUNITY
Start: 2020-09-22 | End: 1900-01-01

## 2020-09-24 PROBLEM — K59.09 CONSTIPATION, CHRONIC: Status: ACTIVE | Noted: 2018-05-18

## 2020-09-24 PROBLEM — K58.1 IRRITABLE BOWEL SYNDROME WITH CONSTIPATION: Status: ACTIVE | Noted: 2020-03-09

## 2020-09-24 PROBLEM — R09.89 GLOBUS SENSATION: Status: ACTIVE | Noted: 2020-09-24

## 2020-09-24 PROBLEM — K52.9 CHRONIC DIARRHEA: Status: ACTIVE | Noted: 2020-08-03

## 2020-09-24 NOTE — ASSESSMENT
[FreeTextEntry1] : 34F w/ a PMH significant for asthma, anxiety/depression, IBS-C who presents to clinic for follow up of reflux symptoms.\par \par #Vomiting\par - strong component of anxiety contributing to this patient's GI symptoms\par - in order to decrease acidity will add on famotidine qhs \par - will refer to Psychiatry to help evaluate anxiety\par \par d/w Dr. Rosado\par \par Mich Toure MD\par PGY-4, Gastroenterology Fellow

## 2020-09-24 NOTE — HISTORY OF PRESENT ILLNESS
[de-identified] : This is a 34F w/ a PMH significant for asthma, anxiety/depression, IBS-C who presents to clinic for follow up of reflux symptoms. She states that she feels like she has heartburn, then she vomits and it is always clear, white liquid. This occurs nearly daily. Prescribed prilosec last month, but this has provided no relief. She has had significant diagnostic w/u in the past, which has generally been unremarkable: EGD showed mild duodenitis, otherwise normal study and biopsies were negative for H. pylori, [employer] pH capsule testing was normal, SmartPill testing showed normal gastric and small bowel transit time, prolonged colonic transit time. She states that she gets very anxious when she eats or drinks anything, and this is related to her concern that she will vomit. The anxiety builds up until she vomits, at which point she till has some anxiety that there is something wrong w/ her GI tract.

## 2020-10-04 ENCOUNTER — FORM ENCOUNTER (OUTPATIENT)
Age: 35
End: 2020-10-04

## 2020-10-08 ENCOUNTER — FORM ENCOUNTER (OUTPATIENT)
Age: 35
End: 2020-10-08

## 2020-10-12 ENCOUNTER — FORM ENCOUNTER (OUTPATIENT)
Age: 35
End: 2020-10-12

## 2020-10-13 ENCOUNTER — APPOINTMENT (OUTPATIENT)
Dept: PSYCHIATRY | Facility: CLINIC | Age: 35
End: 2020-10-13

## 2020-10-23 ENCOUNTER — TRANSCRIPTION ENCOUNTER (OUTPATIENT)
Age: 35
End: 2020-10-23

## 2020-10-26 ENCOUNTER — TRANSCRIPTION ENCOUNTER (OUTPATIENT)
Age: 35
End: 2020-10-26

## 2020-10-27 ENCOUNTER — TRANSCRIPTION ENCOUNTER (OUTPATIENT)
Age: 35
End: 2020-10-27

## 2020-11-01 ENCOUNTER — FORM ENCOUNTER (OUTPATIENT)
Age: 35
End: 2020-11-01

## 2020-11-11 ENCOUNTER — FORM ENCOUNTER (OUTPATIENT)
Age: 35
End: 2020-11-11

## 2020-11-12 ENCOUNTER — APPOINTMENT (OUTPATIENT)
Dept: PSYCHIATRY | Facility: CLINIC | Age: 35
End: 2020-11-12

## 2020-11-24 ENCOUNTER — FORM ENCOUNTER (OUTPATIENT)
Age: 35
End: 2020-11-24

## 2020-11-30 ENCOUNTER — FORM ENCOUNTER (OUTPATIENT)
Age: 35
End: 2020-11-30

## 2020-12-01 ENCOUNTER — FORM ENCOUNTER (OUTPATIENT)
Age: 35
End: 2020-12-01

## 2020-12-02 ENCOUNTER — APPOINTMENT (OUTPATIENT)
Dept: PSYCHIATRY | Facility: CLINIC | Age: 35
End: 2020-12-02

## 2020-12-06 ENCOUNTER — FORM ENCOUNTER (OUTPATIENT)
Age: 35
End: 2020-12-06

## 2020-12-07 ENCOUNTER — FORM ENCOUNTER (OUTPATIENT)
Age: 35
End: 2020-12-07

## 2020-12-08 ENCOUNTER — APPOINTMENT (OUTPATIENT)
Dept: PSYCHIATRY | Facility: CLINIC | Age: 35
End: 2020-12-08

## 2020-12-10 ENCOUNTER — APPOINTMENT (OUTPATIENT)
Dept: PSYCHIATRY | Facility: CLINIC | Age: 35
End: 2020-12-10

## 2020-12-14 ENCOUNTER — FORM ENCOUNTER (OUTPATIENT)
Age: 35
End: 2020-12-14

## 2020-12-15 ENCOUNTER — FORM ENCOUNTER (OUTPATIENT)
Age: 35
End: 2020-12-15

## 2020-12-21 ENCOUNTER — FORM ENCOUNTER (OUTPATIENT)
Age: 35
End: 2020-12-21

## 2020-12-21 PROBLEM — Z01.419 ENCOUNTER FOR ANNUAL ROUTINE GYNECOLOGICAL EXAMINATION: Status: RESOLVED | Noted: 2019-07-25 | Resolved: 2020-12-21

## 2020-12-22 ENCOUNTER — APPOINTMENT (OUTPATIENT)
Dept: PSYCHIATRY | Facility: CLINIC | Age: 35
End: 2020-12-22

## 2020-12-29 ENCOUNTER — APPOINTMENT (OUTPATIENT)
Dept: PSYCHIATRY | Facility: CLINIC | Age: 35
End: 2020-12-29

## 2021-01-04 ENCOUNTER — FORM ENCOUNTER (OUTPATIENT)
Age: 36
End: 2021-01-04

## 2021-01-05 ENCOUNTER — APPOINTMENT (OUTPATIENT)
Dept: PSYCHIATRY | Facility: CLINIC | Age: 36
End: 2021-01-05

## 2021-01-06 ENCOUNTER — NON-APPOINTMENT (OUTPATIENT)
Age: 36
End: 2021-01-06

## 2021-01-06 DIAGNOSIS — F34.1 DYSTHYMIC DISORDER: ICD-10-CM

## 2021-01-06 DIAGNOSIS — F39 UNSPECIFIED MOOD [AFFECTIVE] DISORDER: ICD-10-CM

## 2021-01-06 DIAGNOSIS — F32.9 MAJOR DEPRESSIVE DISORDER, SINGLE EPISODE, UNSPECIFIED: ICD-10-CM

## 2021-01-06 DIAGNOSIS — F60.3 BORDERLINE PERSONALITY DISORDER: ICD-10-CM

## 2021-01-06 DIAGNOSIS — Z92.89 PERSONAL HISTORY OF OTHER MEDICAL TREATMENT: ICD-10-CM

## 2021-01-11 ENCOUNTER — FORM ENCOUNTER (OUTPATIENT)
Age: 36
End: 2021-01-11

## 2021-01-12 ENCOUNTER — APPOINTMENT (OUTPATIENT)
Dept: PSYCHIATRY | Facility: CLINIC | Age: 36
End: 2021-01-12

## 2021-01-19 ENCOUNTER — APPOINTMENT (OUTPATIENT)
Dept: PSYCHIATRY | Facility: CLINIC | Age: 36
End: 2021-01-19

## 2021-01-26 ENCOUNTER — APPOINTMENT (OUTPATIENT)
Dept: PSYCHIATRY | Facility: CLINIC | Age: 36
End: 2021-01-26
Payer: MEDICAID

## 2021-01-26 PROCEDURE — 90834 PSYTX W PT 45 MINUTES: CPT | Mod: 95

## 2021-02-02 ENCOUNTER — APPOINTMENT (OUTPATIENT)
Dept: PSYCHIATRY | Facility: CLINIC | Age: 36
End: 2021-02-02
Payer: MEDICAID

## 2021-02-02 PROCEDURE — 90834 PSYTX W PT 45 MINUTES: CPT | Mod: 95

## 2021-02-03 ENCOUNTER — APPOINTMENT (OUTPATIENT)
Dept: PSYCHIATRY | Facility: CLINIC | Age: 36
End: 2021-02-03
Payer: MEDICAID

## 2021-02-03 DIAGNOSIS — R53.83 OTHER FATIGUE: ICD-10-CM

## 2021-02-03 DIAGNOSIS — F41.0 GENERALIZED ANXIETY DISORDER: ICD-10-CM

## 2021-02-03 DIAGNOSIS — F41.1 GENERALIZED ANXIETY DISORDER: ICD-10-CM

## 2021-02-03 PROCEDURE — 99215 OFFICE O/P EST HI 40 MIN: CPT | Mod: 95

## 2021-02-03 RX ORDER — SERTRALINE HYDROCHLORIDE 50 MG/1
50 TABLET, FILM COATED ORAL
Qty: 30 | Refills: 1 | Status: ACTIVE | COMMUNITY
Start: 2021-02-03 | End: 1900-01-01

## 2021-02-03 RX ORDER — OMEPRAZOLE 40 MG/1
40 CAPSULE, DELAYED RELEASE ORAL DAILY
Qty: 30 | Refills: 1 | Status: DISCONTINUED | COMMUNITY
Start: 2020-08-27 | End: 2021-02-03

## 2021-02-03 RX ORDER — LINACLOTIDE 290 UG/1
290 CAPSULE, GELATIN COATED ORAL
Qty: 30 | Refills: 3 | Status: DISCONTINUED | COMMUNITY
Start: 2020-04-08 | End: 2021-02-03

## 2021-02-03 RX ORDER — SERTRALINE 25 MG/1
25 TABLET, FILM COATED ORAL DAILY
Qty: 30 | Refills: 1 | Status: ACTIVE | COMMUNITY
Start: 2021-02-03 | End: 1900-01-01

## 2021-02-04 ENCOUNTER — FORM ENCOUNTER (OUTPATIENT)
Age: 36
End: 2021-02-04

## 2021-02-09 ENCOUNTER — APPOINTMENT (OUTPATIENT)
Dept: PSYCHIATRY | Facility: CLINIC | Age: 36
End: 2021-02-09
Payer: MEDICAID

## 2021-02-09 PROCEDURE — 90834 PSYTX W PT 45 MINUTES: CPT | Mod: 95

## 2021-02-16 ENCOUNTER — APPOINTMENT (OUTPATIENT)
Dept: PSYCHIATRY | Facility: CLINIC | Age: 36
End: 2021-02-16

## 2021-02-23 ENCOUNTER — APPOINTMENT (OUTPATIENT)
Dept: PSYCHIATRY | Facility: CLINIC | Age: 36
End: 2021-02-23
Payer: MEDICAID

## 2021-02-23 PROCEDURE — 90834 PSYTX W PT 45 MINUTES: CPT | Mod: 95

## 2021-03-02 ENCOUNTER — APPOINTMENT (OUTPATIENT)
Dept: PSYCHIATRY | Facility: CLINIC | Age: 36
End: 2021-03-02

## 2021-03-09 ENCOUNTER — APPOINTMENT (OUTPATIENT)
Dept: PSYCHIATRY | Facility: CLINIC | Age: 36
End: 2021-03-09
Payer: MEDICAID

## 2021-03-09 ENCOUNTER — APPOINTMENT (OUTPATIENT)
Dept: PSYCHIATRY | Facility: CLINIC | Age: 36
End: 2021-03-09

## 2021-03-09 PROCEDURE — 90834 PSYTX W PT 45 MINUTES: CPT | Mod: 95

## 2021-03-16 ENCOUNTER — APPOINTMENT (OUTPATIENT)
Dept: PSYCHIATRY | Facility: CLINIC | Age: 36
End: 2021-03-16

## 2021-03-23 ENCOUNTER — APPOINTMENT (OUTPATIENT)
Dept: PSYCHIATRY | Facility: CLINIC | Age: 36
End: 2021-03-23

## 2021-03-24 ENCOUNTER — APPOINTMENT (OUTPATIENT)
Dept: PSYCHIATRY | Facility: CLINIC | Age: 36
End: 2021-03-24

## 2021-03-24 ENCOUNTER — NON-APPOINTMENT (OUTPATIENT)
Age: 36
End: 2021-03-24

## 2021-03-25 ENCOUNTER — APPOINTMENT (OUTPATIENT)
Dept: PSYCHIATRY | Facility: CLINIC | Age: 36
End: 2021-03-25

## 2021-03-29 ENCOUNTER — NON-APPOINTMENT (OUTPATIENT)
Age: 36
End: 2021-03-29

## 2021-03-30 ENCOUNTER — APPOINTMENT (OUTPATIENT)
Dept: PSYCHIATRY | Facility: CLINIC | Age: 36
End: 2021-03-30

## 2021-04-06 ENCOUNTER — APPOINTMENT (OUTPATIENT)
Dept: PSYCHIATRY | Facility: CLINIC | Age: 36
End: 2021-04-06

## 2021-04-13 ENCOUNTER — APPOINTMENT (OUTPATIENT)
Dept: PSYCHIATRY | Facility: CLINIC | Age: 36
End: 2021-04-13
Payer: MEDICAID

## 2021-04-13 PROCEDURE — 90834 PSYTX W PT 45 MINUTES: CPT | Mod: 95

## 2021-04-20 ENCOUNTER — APPOINTMENT (OUTPATIENT)
Dept: PSYCHIATRY | Facility: CLINIC | Age: 36
End: 2021-04-20
Payer: MEDICAID

## 2021-04-20 PROCEDURE — 90834 PSYTX W PT 45 MINUTES: CPT | Mod: 95

## 2021-04-27 ENCOUNTER — APPOINTMENT (OUTPATIENT)
Dept: PSYCHIATRY | Facility: CLINIC | Age: 36
End: 2021-04-27

## 2021-05-04 ENCOUNTER — APPOINTMENT (OUTPATIENT)
Dept: PSYCHIATRY | Facility: CLINIC | Age: 36
End: 2021-05-04

## 2021-05-11 ENCOUNTER — NON-APPOINTMENT (OUTPATIENT)
Age: 36
End: 2021-05-11

## 2021-05-11 ENCOUNTER — APPOINTMENT (OUTPATIENT)
Dept: PSYCHIATRY | Facility: CLINIC | Age: 36
End: 2021-05-11

## 2021-05-18 ENCOUNTER — APPOINTMENT (OUTPATIENT)
Dept: PSYCHIATRY | Facility: CLINIC | Age: 36
End: 2021-05-18

## 2021-05-18 ENCOUNTER — NON-APPOINTMENT (OUTPATIENT)
Age: 36
End: 2021-05-18

## 2021-05-19 ENCOUNTER — NON-APPOINTMENT (OUTPATIENT)
Age: 36
End: 2021-05-19

## 2021-05-20 ENCOUNTER — NON-APPOINTMENT (OUTPATIENT)
Age: 36
End: 2021-05-20

## 2021-05-25 ENCOUNTER — APPOINTMENT (OUTPATIENT)
Dept: PSYCHIATRY | Facility: CLINIC | Age: 36
End: 2021-05-25

## 2021-06-01 ENCOUNTER — APPOINTMENT (OUTPATIENT)
Dept: PSYCHIATRY | Facility: CLINIC | Age: 36
End: 2021-06-01

## 2021-06-08 ENCOUNTER — APPOINTMENT (OUTPATIENT)
Dept: PSYCHIATRY | Facility: CLINIC | Age: 36
End: 2021-06-08

## 2021-06-15 ENCOUNTER — APPOINTMENT (OUTPATIENT)
Dept: PSYCHIATRY | Facility: CLINIC | Age: 36
End: 2021-06-15

## 2021-06-22 ENCOUNTER — APPOINTMENT (OUTPATIENT)
Dept: PSYCHIATRY | Facility: CLINIC | Age: 36
End: 2021-06-22

## 2021-06-29 ENCOUNTER — APPOINTMENT (OUTPATIENT)
Dept: PSYCHIATRY | Facility: CLINIC | Age: 36
End: 2021-06-29

## 2021-07-06 ENCOUNTER — APPOINTMENT (OUTPATIENT)
Dept: PSYCHIATRY | Facility: CLINIC | Age: 36
End: 2021-07-06

## 2021-07-13 ENCOUNTER — APPOINTMENT (OUTPATIENT)
Dept: PSYCHIATRY | Facility: CLINIC | Age: 36
End: 2021-07-13

## 2021-07-20 ENCOUNTER — APPOINTMENT (OUTPATIENT)
Dept: PSYCHIATRY | Facility: CLINIC | Age: 36
End: 2021-07-20

## 2021-07-27 ENCOUNTER — APPOINTMENT (OUTPATIENT)
Dept: PSYCHIATRY | Facility: CLINIC | Age: 36
End: 2021-07-27
